# Patient Record
Sex: MALE | Race: WHITE | NOT HISPANIC OR LATINO | URBAN - METROPOLITAN AREA
[De-identification: names, ages, dates, MRNs, and addresses within clinical notes are randomized per-mention and may not be internally consistent; named-entity substitution may affect disease eponyms.]

---

## 2022-08-30 ENCOUNTER — EMERGENCY (EMERGENCY)
Facility: HOSPITAL | Age: 52
LOS: 0 days | Discharge: HOME | End: 2022-08-30
Attending: STUDENT IN AN ORGANIZED HEALTH CARE EDUCATION/TRAINING PROGRAM

## 2022-08-30 VITALS
TEMPERATURE: 98 F | OXYGEN SATURATION: 99 % | HEART RATE: 66 BPM | RESPIRATION RATE: 18 BRPM | DIASTOLIC BLOOD PRESSURE: 79 MMHG | SYSTOLIC BLOOD PRESSURE: 123 MMHG

## 2022-08-30 VITALS
OXYGEN SATURATION: 98 % | SYSTOLIC BLOOD PRESSURE: 114 MMHG | DIASTOLIC BLOOD PRESSURE: 74 MMHG | TEMPERATURE: 98 F | HEART RATE: 70 BPM | RESPIRATION RATE: 19 BRPM
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
## 2022-08-30 DIAGNOSIS — S61.217A LACERATION WITHOUT FOREIGN BODY OF LEFT LITTLE FINGER WITHOUT DAMAGE TO NAIL, INITIAL ENCOUNTER: ICD-10-CM
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
## 2022-08-30 DIAGNOSIS — Z23 ENCOUNTER FOR IMMUNIZATION: ICD-10-CM

## 2022-08-30 DIAGNOSIS — Z87.891 PERSONAL HISTORY OF NICOTINE DEPENDENCE: ICD-10-CM

## 2022-08-30 PROCEDURE — 73130 X-RAY EXAM OF HAND: CPT | Mod: 26,LT

## 2022-08-30 PROCEDURE — 99283 EMERGENCY DEPT VISIT LOW MDM: CPT | Mod: 25

## 2022-08-30 PROCEDURE — 12001 RPR S/N/AX/GEN/TRNK 2.5CM/<: CPT

## 2022-08-30 RX ORDER — TETANUS TOXOID, REDUCED DIPHTHERIA TOXOID AND ACELLULAR PERTUSSIS VACCINE, ADSORBED 5; 2.5; 8; 8; 2.5 [IU]/.5ML; [IU]/.5ML; UG/.5ML; UG/.5ML; UG/.5ML
0.5 SUSPENSION INTRAMUSCULAR ONCE
Refills: 0 | Status: COMPLETED | OUTPATIENT
Start: 2022-08-30 | End: 2022-08-30

## 2022-08-30 RX ADMIN — TETANUS TOXOID, REDUCED DIPHTHERIA TOXOID AND ACELLULAR PERTUSSIS VACCINE, ADSORBED 0.5 MILLILITER(S): 5; 2.5; 8; 8; 2.5 SUSPENSION INTRAMUSCULAR at 15:50

## 2022-08-30 NOTE — ED ADULT NURSE NOTE - CHIEF COMPLAINT QUOTE
pt states he injured his left pinky finger at work today.  noted with laceration on left  pinky finger. History of seizure.

## 2022-08-30 NOTE — ED PROVIDER NOTE - CLINICAL SUMMARY MEDICAL DECISION MAKING FREE TEXT BOX
53yo male presenting with L 5th digit injury/ laceration.  Will XR r/o fracture/ fb.  alida Zelaya.  Update tdap.

## 2022-08-30 NOTE — ED PROVIDER NOTE - OBJECTIVE STATEMENT
53yo male with pmh coarctation of aorta, seizure disorder, presenting with L 5th finger injury.  Sustained while moving a piece of sheet rock, he hit pinky finger into ladder and has laceration.  Bleeding controlled.  Minimal pain at this time.  No weakness, numbness.  Last tdap unknown.  No other injuries.

## 2022-08-30 NOTE — ED PROVIDER NOTE - NSFOLLOWUPINSTRUCTIONS_ED_ALL_ED_FT
Rest, drink plenty of fluids  Advance activity as tolerated  Continue all previously prescribed medications as directed  Follow up with your PMD - bring copies of your results  Return to the ER for redness, fevers, worsening pain, discharge, or other new or concerning symptoms   For pain you may take Tylenol 650mg every six hours and supplement with ibuprofen 600mg, with food, every six hours which can be taken three hours apart from the Tylenol to have a layered effect.     - Some swelling, redness, and pain are common with all wounds and normally will go away as the wound heals. If swelling, redness, or pain increases or if the wound feels warm to the touch from the wound, contact your PMD or us. The stiches or staples are generally removed within 7-14 days. For any other concerns, such as re-opening the wound, fever, or pus from the wound, please contact us immediately.  - Leave original bandages on the wound for the first 24 hours. After this time, showering or rinsing is recommended.   - After the first day, remove old bandages and gently cleanse the wound with soap and water. Pat dry, do not rub the site. Cleansing twice a day prevents buildup of debris and will result in easier suture removal.  Avoid anything other than soap and water to clean.

## 2022-08-30 NOTE — ED PROVIDER NOTE - PATIENT PORTAL LINK FT
You can access the FollowMyHealth Patient Portal offered by [facility] by registering at the following website: [email or web address]. By joining [employer]’s FollowMyHealth portal, you will also be able to view your health information using other applications (apps) compatible with our system.

## 2022-08-30 NOTE — ED PROVIDER NOTE - PHYSICAL EXAMINATION
General appearance: Nontoxic appearing, conversant, afebrile    Eyes: anicteric sclerae, MANISH, EOMI   Neck: Trachea midline; Full range of motion, supple   Pulm: normal respiratory effort and no intercostal retractions, normal work of breathing   Extremities: No peripheral edema, no gross deformities, FROM x4, 5/5 MS x4, gross sensation intact, L 5th finger flexion/ extension at each joint intact   Skin: Dry, normal temperature, turgor and texture; no rash, 1.5 cm laceration L pinky finger dorsum    Psych: Appropriate affect, cooperative

## 2022-08-30 NOTE — ED ADULT TRIAGE NOTE - CHIEF COMPLAINT QUOTE
pt states he injured his left pinky at work today.  noted with laceration on left  pinky finger. History of seizure. pt states he injured his left pinky finger at work today.  noted with laceration on left  pinky finger. History of seizure.

## 2022-09-12 ENCOUNTER — EMERGENCY (EMERGENCY)
Facility: HOSPITAL | Age: 52
LOS: 0 days | Discharge: HOME | End: 2022-09-12
Attending: EMERGENCY MEDICINE

## 2022-09-12 VITALS
WEIGHT: 190.04 LBS | RESPIRATION RATE: 20 BRPM | DIASTOLIC BLOOD PRESSURE: 74 MMHG | SYSTOLIC BLOOD PRESSURE: 112 MMHG | HEIGHT: 67 IN | OXYGEN SATURATION: 96 % | TEMPERATURE: 99 F | HEART RATE: 72 BPM

## 2022-09-12 VITALS
RESPIRATION RATE: 18 BRPM | TEMPERATURE: 99 F | SYSTOLIC BLOOD PRESSURE: 112 MMHG | OXYGEN SATURATION: 97 % | DIASTOLIC BLOOD PRESSURE: 72 MMHG | HEART RATE: 68 BPM

## 2022-09-12 DIAGNOSIS — S69.92XD UNSPECIFIED INJURY OF LEFT WRIST, HAND AND FINGER(S), SUBSEQUENT ENCOUNTER: ICD-10-CM
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
## 2022-09-12 PROCEDURE — L9995: CPT

## 2022-09-12 NOTE — ED ADULT NURSE NOTE - OBJECTIVE STATEMENT
Pt AOx4, responsive, ambulatory w/o assistive device. Pt here for suture removal of Left 5th digit, c/o mild pain to the digit. PMH of HLD. Surgical hx of left temporal lobectomy, heart surgery.

## 2022-09-12 NOTE — ED PROVIDER NOTE - PATIENT PORTAL LINK FT
You can access the FollowMyHealth Patient Portal offered by [facility] by registering at the following website: [email or web address]. By joining [employer]’s FollowMyHealth portal, you will also be able to view your health information using other applications (apps) compatible with our system.

## 2022-09-12 NOTE — ED PROVIDER NOTE - NSFOLLOWUPINSTRUCTIONS_ED_ALL_ED_FT
Since you are supposed to be on antibiotics for your strep throat, you can take CLINDAMYCIN for both that and the finger.    I would like you on the antibiotic for 5 days, but will prescribe 7 days worth in case you doctor wants you on antibiotics longer.    Take each dosage with yogurt like ACTIVIA yogurt to prevent the good bacteria in your intestines from dying.

## 2022-09-12 NOTE — ED ADULT NURSE NOTE - NS ED NURSE LEVEL OF CONSCIOUSNESS ORIENTATION
Dr Sainz called from [place] ED  CT neg, couldn't tolerate MRI due to claustrophobia.  On 2/1/21 pt to f/u with Stroke clinic discussed and arranged by Chelsi Stewart, neurologist.   No other Bell's Palsy symptoms.   No rash or pain to indicate H Zoster.   
I was paged through [employer] and called the patient back.    The patient complains of numbness of left face. Onset Yesterday. No drooping.  Patient states that when he smiles and raises his eyebrows that his face is symmetrical.  He denies any other symptoms.    The patient saw Dr. Agrawal a few years ago and generally calls him when he has medical concerns.    He has had 2 brief episodes of half of his face going numb for a few minutes in the past 2 years, but it resolved within a few minutes so he did not see a doctor for this.    The patient sees Dr Foreman nephrologist in [place] and is on a blood pressure medication.     I strongly encouraged the pt to Go to the ED now.  He said he would go to the emergency room prior to hanging up the phone.  
Thanks  
Oriented - self; Oriented - place; Oriented - time

## 2022-09-12 NOTE — ED PROVIDER NOTE - CLINICAL SUMMARY MEDICAL DECISION MAKING FREE TEXT BOX
suture removal. likely having cellulitis. abx. pt supposed to take abx for strep throat. will give clinda to take both.

## 2022-09-12 NOTE — ED PROVIDER NOTE - OBJECTIVE STATEMENT
52m hx seizures and left pinky injury sustained 2 weeks ago and sutured at [facility] pw need for suture removal. slight tenderness where the sutures were. no discharge, fever, nausea, vomiting.

## 2022-09-12 NOTE — ED PROVIDER NOTE - PHYSICAL EXAMINATION
Gen: Alert, NAD  Head: NC, AT   Eyes: PERRL, EOMI, normal lids/conjunctiva  ENT: normal hearing, patent oropharynx without erythema/exudate, uvula midline  Neck: supple, no tenderness, Trachea midline  Pulm: Bilateral BS, normal resp effort, no wheeze/stridor/retractions  CV: RRR, no M/R/G, 2+ radial and dp pulses bl, no edema  Abd: soft, NT/ND, +BS, no hepatosplenomegaly  Mskel: extremities x4 with normal ROM and no joint effusions. no ctl spine ttp.   Skin: pinky dorsum has 5 stiches in place with slight erythema and tenderness overlying it. no pus.   Neuro: AAOx3, no sensory/motor deficits, CN 2-12 intact

## 2022-12-25 NOTE — ED PROCEDURE NOTE - CPROC ED INFORMED CONSENT1
Benefits, risks, and possible complications of procedure explained to patient/caregiver who verbalized understanding and gave verbal consent. will see patient in ED

## 2024-10-07 NOTE — ED ADULT NURSE NOTE - NS TRANSFER PATIENT BELONGINGS
Gen - No acute distress, appears comfortable  HEENT - NCAT, EOMI  Resp - even chest rise, no tachypnea/increased WOB  Abd - nondistended  MSK - no gross deformities  Extrem - no LE edema  Neuro - no focal motor or sensation deficits  Skin - warm, well perfused; +1.5 cm well approximating superficial hemostatic laceration to R 4th digit tip, adjacent to nail
Cell Phone/PDA (specify)/Clothing